# Patient Record
Sex: FEMALE | Race: BLACK OR AFRICAN AMERICAN | Employment: OTHER | URBAN - METROPOLITAN AREA
[De-identification: names, ages, dates, MRNs, and addresses within clinical notes are randomized per-mention and may not be internally consistent; named-entity substitution may affect disease eponyms.]

---

## 2023-09-18 ENCOUNTER — HOSPITAL ENCOUNTER (OUTPATIENT)
Facility: HOSPITAL | Age: 68
Discharge: HOME | End: 2023-09-21
Payer: MEDICARE

## 2023-09-18 DIAGNOSIS — M17.12 OSTEOARTHRITIS OF LEFT KNEE, UNSPECIFIED OSTEOARTHRITIS TYPE: ICD-10-CM

## 2023-09-18 LAB
ALBUMIN SERPL-MCNC: 3.5 G/DL (ref 3.4–5)
ALBUMIN/GLOB SERPL: 1.1 (ref 0.8–1.7)
ALP SERPL-CCNC: 66 U/L (ref 45–117)
ALT SERPL-CCNC: 18 U/L (ref 13–56)
ANION GAP SERPL CALC-SCNC: 4 MMOL/L (ref 3–18)
APPEARANCE UR: CLEAR
APTT PPP: 28.5 SEC (ref 23–36.4)
AST SERPL-CCNC: 11 U/L (ref 10–38)
BACTERIA URNS QL MICRO: ABNORMAL /HPF
BASOPHILS # BLD: 0 K/UL (ref 0–0.1)
BASOPHILS NFR BLD: 1 % (ref 0–2)
BILIRUB SERPL-MCNC: 0.4 MG/DL (ref 0.2–1)
BILIRUB UR QL: NEGATIVE
BUN SERPL-MCNC: 21 MG/DL (ref 7–18)
BUN/CREAT SERPL: 21 (ref 12–20)
CALCIUM SERPL-MCNC: 9 MG/DL (ref 8.5–10.1)
CHLORIDE SERPL-SCNC: 110 MMOL/L (ref 100–111)
CO2 SERPL-SCNC: 28 MMOL/L (ref 21–32)
COLOR UR: YELLOW
CREAT SERPL-MCNC: 0.99 MG/DL (ref 0.6–1.3)
DIFFERENTIAL METHOD BLD: ABNORMAL
EKG ATRIAL RATE: 56 BPM
EKG DIAGNOSIS: NORMAL
EKG P AXIS: 65 DEGREES
EKG P-R INTERVAL: 154 MS
EKG Q-T INTERVAL: 414 MS
EKG QRS DURATION: 84 MS
EKG QTC CALCULATION (BAZETT): 399 MS
EKG R AXIS: 74 DEGREES
EKG T AXIS: 63 DEGREES
EKG VENTRICULAR RATE: 56 BPM
EOSINOPHIL # BLD: 0.2 K/UL (ref 0–0.4)
EOSINOPHIL NFR BLD: 3 % (ref 0–5)
EPITH CASTS URNS QL MICRO: ABNORMAL /LPF (ref 0–5)
ERYTHROCYTE [DISTWIDTH] IN BLOOD BY AUTOMATED COUNT: 13.3 % (ref 11.6–14.5)
GLOBULIN SER CALC-MCNC: 3.2 G/DL (ref 2–4)
GLUCOSE SERPL-MCNC: 86 MG/DL (ref 74–99)
GLUCOSE UR STRIP.AUTO-MCNC: NEGATIVE MG/DL
HCT VFR BLD AUTO: 42.6 % (ref 35–45)
HGB BLD-MCNC: 13.4 G/DL (ref 12–16)
HGB UR QL STRIP: NEGATIVE
IMM GRANULOCYTES # BLD AUTO: 0 K/UL (ref 0–0.04)
IMM GRANULOCYTES NFR BLD AUTO: 1 % (ref 0–0.5)
INR PPP: 1 (ref 0.9–1.1)
KETONES UR QL STRIP.AUTO: NEGATIVE MG/DL
LEUKOCYTE ESTERASE UR QL STRIP.AUTO: ABNORMAL
LYMPHOCYTES # BLD: 2.7 K/UL (ref 0.9–3.6)
LYMPHOCYTES NFR BLD: 31 % (ref 21–52)
MCH RBC QN AUTO: 26.4 PG (ref 24–34)
MCHC RBC AUTO-ENTMCNC: 31.5 G/DL (ref 31–37)
MCV RBC AUTO: 84 FL (ref 78–100)
MONOCYTES # BLD: 0.7 K/UL (ref 0.05–1.2)
MONOCYTES NFR BLD: 8 % (ref 3–10)
NEUTS SEG # BLD: 4.8 K/UL (ref 1.8–8)
NEUTS SEG NFR BLD: 57 % (ref 40–73)
NITRITE UR QL STRIP.AUTO: NEGATIVE
NRBC # BLD: 0 K/UL (ref 0–0.01)
NRBC BLD-RTO: 0 PER 100 WBC
PH UR STRIP: 6 (ref 5–8)
PLATELET # BLD AUTO: 232 K/UL (ref 135–420)
PMV BLD AUTO: 9.6 FL (ref 9.2–11.8)
POTASSIUM SERPL-SCNC: 4.5 MMOL/L (ref 3.5–5.5)
PROT SERPL-MCNC: 6.7 G/DL (ref 6.4–8.2)
PROT UR STRIP-MCNC: NEGATIVE MG/DL
PROTHROMBIN TIME: 13.1 SEC (ref 11.9–14.7)
RBC # BLD AUTO: 5.07 M/UL (ref 4.2–5.3)
RBC #/AREA URNS HPF: ABNORMAL /HPF (ref 0–5)
SODIUM SERPL-SCNC: 142 MMOL/L (ref 136–145)
SP GR UR REFRACTOMETRY: 1.02 (ref 1–1.03)
UROBILINOGEN UR QL STRIP.AUTO: 1 EU/DL (ref 0.2–1)
WBC # BLD AUTO: 8.5 K/UL (ref 4.6–13.2)
WBC URNS QL MICRO: ABNORMAL /HPF (ref 0–5)

## 2023-09-18 PROCEDURE — 85730 THROMBOPLASTIN TIME PARTIAL: CPT

## 2023-09-18 PROCEDURE — 87186 SC STD MICRODIL/AGAR DIL: CPT

## 2023-09-18 PROCEDURE — 87077 CULTURE AEROBIC IDENTIFY: CPT

## 2023-09-18 PROCEDURE — 93005 ELECTROCARDIOGRAM TRACING: CPT

## 2023-09-18 PROCEDURE — 87086 URINE CULTURE/COLONY COUNT: CPT

## 2023-09-18 PROCEDURE — 87147 CULTURE TYPE IMMUNOLOGIC: CPT

## 2023-09-18 PROCEDURE — 80053 COMPREHEN METABOLIC PANEL: CPT

## 2023-09-18 PROCEDURE — 85610 PROTHROMBIN TIME: CPT

## 2023-09-18 PROCEDURE — 36415 COLL VENOUS BLD VENIPUNCTURE: CPT

## 2023-09-18 PROCEDURE — 85025 COMPLETE CBC W/AUTO DIFF WBC: CPT

## 2023-09-18 PROCEDURE — 81001 URINALYSIS AUTO W/SCOPE: CPT

## 2023-09-18 RX ORDER — LOSARTAN POTASSIUM 25 MG/1
25 TABLET ORAL DAILY
COMMUNITY
Start: 2023-06-20

## 2023-09-19 LAB
BACTERIA SPEC CULT: NORMAL
BACTERIA SPEC CULT: NORMAL
SERVICE CMNT-IMP: NORMAL

## 2023-09-20 LAB
BACTERIA SPEC CULT: ABNORMAL
BACTERIA SPEC CULT: ABNORMAL
CC UR VC: ABNORMAL
FAX TO NUMBER: NORMAL
SERVICE CMNT-IMP: ABNORMAL
TEST RESULTS FAXED TO: NORMAL

## 2023-09-26 LAB
FAX TO NUMBER: NORMAL
TEST RESULTS FAXED TO: NORMAL

## 2023-10-16 ENCOUNTER — ANESTHESIA EVENT (OUTPATIENT)
Facility: HOSPITAL | Age: 68
End: 2023-10-16
Payer: MEDICARE

## 2023-10-16 ENCOUNTER — HOSPITAL ENCOUNTER (OUTPATIENT)
Facility: HOSPITAL | Age: 68
Setting detail: OBSERVATION
Discharge: HOME HEALTH | End: 2023-10-17
Attending: ORTHOPAEDIC SURGERY | Admitting: ORTHOPAEDIC SURGERY
Payer: MEDICARE

## 2023-10-16 ENCOUNTER — APPOINTMENT (OUTPATIENT)
Facility: HOSPITAL | Age: 68
End: 2023-10-16
Attending: ORTHOPAEDIC SURGERY
Payer: MEDICARE

## 2023-10-16 ENCOUNTER — ANESTHESIA (OUTPATIENT)
Facility: HOSPITAL | Age: 68
End: 2023-10-16
Payer: MEDICARE

## 2023-10-16 DIAGNOSIS — Z96.652 S/P TOTAL KNEE ARTHROPLASTY, LEFT: Primary | ICD-10-CM

## 2023-10-16 PROBLEM — M17.12 OSTEOARTHRITIS OF LEFT KNEE, UNSPECIFIED OSTEOARTHRITIS TYPE: Status: ACTIVE | Noted: 2023-10-16

## 2023-10-16 PROCEDURE — G0378 HOSPITAL OBSERVATION PER HR: HCPCS

## 2023-10-16 PROCEDURE — 73560 X-RAY EXAM OF KNEE 1 OR 2: CPT

## 2023-10-16 PROCEDURE — 3700000000 HC ANESTHESIA ATTENDED CARE: Performed by: ORTHOPAEDIC SURGERY

## 2023-10-16 PROCEDURE — 6360000002 HC RX W HCPCS: Performed by: ORTHOPAEDIC SURGERY

## 2023-10-16 PROCEDURE — 36415 COLL VENOUS BLD VENIPUNCTURE: CPT

## 2023-10-16 PROCEDURE — 6360000002 HC RX W HCPCS

## 2023-10-16 PROCEDURE — C1713 ANCHOR/SCREW BN/BN,TIS/BN: HCPCS | Performed by: ORTHOPAEDIC SURGERY

## 2023-10-16 PROCEDURE — 86901 BLOOD TYPING SEROLOGIC RH(D): CPT

## 2023-10-16 PROCEDURE — 6370000000 HC RX 637 (ALT 250 FOR IP): Performed by: ORTHOPAEDIC SURGERY

## 2023-10-16 PROCEDURE — 86850 RBC ANTIBODY SCREEN: CPT

## 2023-10-16 PROCEDURE — 64447 NJX AA&/STRD FEMORAL NRV IMG: CPT | Performed by: STUDENT IN AN ORGANIZED HEALTH CARE EDUCATION/TRAINING PROGRAM

## 2023-10-16 PROCEDURE — 97161 PT EVAL LOW COMPLEX 20 MIN: CPT

## 2023-10-16 PROCEDURE — 2580000003 HC RX 258: Performed by: ORTHOPAEDIC SURGERY

## 2023-10-16 PROCEDURE — 2500000003 HC RX 250 WO HCPCS: Performed by: STUDENT IN AN ORGANIZED HEALTH CARE EDUCATION/TRAINING PROGRAM

## 2023-10-16 PROCEDURE — 97116 GAIT TRAINING THERAPY: CPT

## 2023-10-16 PROCEDURE — 7100000001 HC PACU RECOVERY - ADDTL 15 MIN: Performed by: ORTHOPAEDIC SURGERY

## 2023-10-16 PROCEDURE — 3700000001 HC ADD 15 MINUTES (ANESTHESIA): Performed by: ORTHOPAEDIC SURGERY

## 2023-10-16 PROCEDURE — C1776 JOINT DEVICE (IMPLANTABLE): HCPCS | Performed by: ORTHOPAEDIC SURGERY

## 2023-10-16 PROCEDURE — 3600000005 HC SURGERY LEVEL 5 BASE: Performed by: ORTHOPAEDIC SURGERY

## 2023-10-16 PROCEDURE — 6360000002 HC RX W HCPCS: Performed by: STUDENT IN AN ORGANIZED HEALTH CARE EDUCATION/TRAINING PROGRAM

## 2023-10-16 PROCEDURE — 2500000003 HC RX 250 WO HCPCS

## 2023-10-16 PROCEDURE — 86900 BLOOD TYPING SEROLOGIC ABO: CPT

## 2023-10-16 PROCEDURE — 7100000000 HC PACU RECOVERY - FIRST 15 MIN: Performed by: ORTHOPAEDIC SURGERY

## 2023-10-16 PROCEDURE — C9290 INJ, BUPIVACAINE LIPOSOME: HCPCS | Performed by: ORTHOPAEDIC SURGERY

## 2023-10-16 PROCEDURE — 2720000010 HC SURG SUPPLY STERILE: Performed by: ORTHOPAEDIC SURGERY

## 2023-10-16 PROCEDURE — 97530 THERAPEUTIC ACTIVITIES: CPT

## 2023-10-16 PROCEDURE — 2709999900 HC NON-CHARGEABLE SUPPLY: Performed by: ORTHOPAEDIC SURGERY

## 2023-10-16 PROCEDURE — 3600000015 HC SURGERY LEVEL 5 ADDTL 15MIN: Performed by: ORTHOPAEDIC SURGERY

## 2023-10-16 DEVICE — ATTUNE PATELLA MEDIALIZED DOME 35MM CEMENTED AOX
Type: IMPLANTABLE DEVICE | Site: KNEE | Status: FUNCTIONAL
Brand: ATTUNE

## 2023-10-16 DEVICE — ATTUNE KNEE SYSTEM TIBIAL BASE AFFIXIUM FIXED BEARING SIZE 6
Type: IMPLANTABLE DEVICE | Site: KNEE | Status: FUNCTIONAL
Brand: ATTUNE AFFIXIUM

## 2023-10-16 DEVICE — ATTUNE KNEE SYSTEM FEMORAL POROCOAT CRUCIATE RETAINING SIZE 6 LEFT CEMENTLESS
Type: IMPLANTABLE DEVICE | Site: KNEE | Status: FUNCTIONAL
Brand: ATTUNE

## 2023-10-16 DEVICE — ATTUNE KNEE SYSTEM TIBIAL INSERT FIXED BEARING MEDIAL STABILIZED LEFT AOX 6, 6MM
Type: IMPLANTABLE DEVICE | Site: KNEE | Status: FUNCTIONAL
Brand: ATTUNE

## 2023-10-16 DEVICE — CEMENT BNE 20GM HALF DOSE PMMA VISC RADPQ FAST: Type: IMPLANTABLE DEVICE | Site: KNEE | Status: FUNCTIONAL

## 2023-10-16 RX ORDER — OXYCODONE HYDROCHLORIDE AND ACETAMINOPHEN 5; 325 MG/1; MG/1
1 TABLET ORAL EVERY 4 HOURS PRN
Qty: 42 TABLET | Refills: 0 | Status: SHIPPED | OUTPATIENT
Start: 2023-10-16 | End: 2023-10-23

## 2023-10-16 RX ORDER — CEFADROXIL 500 MG/1
500 CAPSULE ORAL 2 TIMES DAILY
Qty: 14 CAPSULE | Refills: 0 | Status: SHIPPED | OUTPATIENT
Start: 2023-10-16 | End: 2023-10-23

## 2023-10-16 RX ORDER — OXYCODONE HYDROCHLORIDE 5 MG/1
10 TABLET ORAL EVERY 4 HOURS PRN
Status: DISCONTINUED | OUTPATIENT
Start: 2023-10-16 | End: 2023-10-17 | Stop reason: HOSPADM

## 2023-10-16 RX ORDER — ROPIVACAINE HYDROCHLORIDE 5 MG/ML
INJECTION, SOLUTION EPIDURAL; INFILTRATION; PERINEURAL
Status: COMPLETED | OUTPATIENT
Start: 2023-10-16 | End: 2023-10-16

## 2023-10-16 RX ORDER — DEXAMETHASONE SODIUM PHOSPHATE 10 MG/ML
INJECTION, SOLUTION INTRAMUSCULAR; INTRAVENOUS
Status: COMPLETED | OUTPATIENT
Start: 2023-10-16 | End: 2023-10-16

## 2023-10-16 RX ORDER — CEPHALEXIN 500 MG/1
CAPSULE ORAL
COMMUNITY
Start: 2023-09-29

## 2023-10-16 RX ORDER — SODIUM CHLORIDE 0.9 % (FLUSH) 0.9 %
5-40 SYRINGE (ML) INJECTION EVERY 12 HOURS SCHEDULED
Status: DISCONTINUED | OUTPATIENT
Start: 2023-10-16 | End: 2023-10-17 | Stop reason: HOSPADM

## 2023-10-16 RX ORDER — DROPERIDOL 2.5 MG/ML
0.62 INJECTION, SOLUTION INTRAMUSCULAR; INTRAVENOUS
Status: DISCONTINUED | OUTPATIENT
Start: 2023-10-16 | End: 2023-10-16 | Stop reason: HOSPADM

## 2023-10-16 RX ORDER — ACETAMINOPHEN 325 MG/1
650 TABLET ORAL EVERY 6 HOURS
Status: DISCONTINUED | OUTPATIENT
Start: 2023-10-16 | End: 2023-10-17 | Stop reason: HOSPADM

## 2023-10-16 RX ORDER — TRANEXAMIC ACID 10 MG/ML
INJECTION, SOLUTION INTRAVENOUS PRN
Status: DISCONTINUED | OUTPATIENT
Start: 2023-10-16 | End: 2023-10-16 | Stop reason: SDUPTHER

## 2023-10-16 RX ORDER — DIPHENHYDRAMINE HYDROCHLORIDE 50 MG/ML
12.5 INJECTION INTRAMUSCULAR; INTRAVENOUS
Status: DISCONTINUED | OUTPATIENT
Start: 2023-10-16 | End: 2023-10-16 | Stop reason: HOSPADM

## 2023-10-16 RX ORDER — SODIUM CHLORIDE 0.9 % (FLUSH) 0.9 %
5-40 SYRINGE (ML) INJECTION EVERY 12 HOURS SCHEDULED
Status: DISCONTINUED | OUTPATIENT
Start: 2023-10-16 | End: 2023-10-16 | Stop reason: HOSPADM

## 2023-10-16 RX ORDER — DEXMEDETOMIDINE HYDROCHLORIDE 100 UG/ML
INJECTION, SOLUTION INTRAVENOUS
Status: COMPLETED | OUTPATIENT
Start: 2023-10-16 | End: 2023-10-16

## 2023-10-16 RX ORDER — HYDROMORPHONE HYDROCHLORIDE 1 MG/ML
0.25 INJECTION, SOLUTION INTRAMUSCULAR; INTRAVENOUS; SUBCUTANEOUS
Status: DISCONTINUED | OUTPATIENT
Start: 2023-10-16 | End: 2023-10-17 | Stop reason: HOSPADM

## 2023-10-16 RX ORDER — OXYCODONE HYDROCHLORIDE 5 MG/1
5 TABLET ORAL EVERY 4 HOURS PRN
Status: DISCONTINUED | OUTPATIENT
Start: 2023-10-16 | End: 2023-10-17 | Stop reason: HOSPADM

## 2023-10-16 RX ORDER — ONDANSETRON 2 MG/ML
4 INJECTION INTRAMUSCULAR; INTRAVENOUS
Status: DISCONTINUED | OUTPATIENT
Start: 2023-10-16 | End: 2023-10-16 | Stop reason: HOSPADM

## 2023-10-16 RX ORDER — PROPOFOL 10 MG/ML
INJECTION, EMULSION INTRAVENOUS CONTINUOUS PRN
Status: DISCONTINUED | OUTPATIENT
Start: 2023-10-16 | End: 2023-10-16 | Stop reason: SDUPTHER

## 2023-10-16 RX ORDER — LIDOCAINE HYDROCHLORIDE 20 MG/ML
INJECTION, SOLUTION INTRAVENOUS PRN
Status: DISCONTINUED | OUTPATIENT
Start: 2023-10-16 | End: 2023-10-16 | Stop reason: SDUPTHER

## 2023-10-16 RX ORDER — SENNOSIDES 8.6 MG
650 CAPSULE ORAL EVERY 8 HOURS PRN
Qty: 60 TABLET | Refills: 3 | Status: SHIPPED | OUTPATIENT
Start: 2023-10-16

## 2023-10-16 RX ORDER — SODIUM CHLORIDE, SODIUM LACTATE, POTASSIUM CHLORIDE, CALCIUM CHLORIDE 600; 310; 30; 20 MG/100ML; MG/100ML; MG/100ML; MG/100ML
INJECTION, SOLUTION INTRAVENOUS CONTINUOUS
Status: DISCONTINUED | OUTPATIENT
Start: 2023-10-16 | End: 2023-10-16

## 2023-10-16 RX ORDER — ATORVASTATIN CALCIUM 10 MG/1
10 TABLET, FILM COATED ORAL DAILY
Status: DISCONTINUED | OUTPATIENT
Start: 2023-10-16 | End: 2023-10-17 | Stop reason: HOSPADM

## 2023-10-16 RX ORDER — BUPIVACAINE HYDROCHLORIDE 7.5 MG/ML
INJECTION, SOLUTION INTRASPINAL
Status: COMPLETED | OUTPATIENT
Start: 2023-10-16 | End: 2023-10-16

## 2023-10-16 RX ORDER — MIDAZOLAM HYDROCHLORIDE 2 MG/2ML
2 INJECTION, SOLUTION INTRAMUSCULAR; INTRAVENOUS
Status: DISCONTINUED | OUTPATIENT
Start: 2023-10-16 | End: 2023-10-16 | Stop reason: HOSPADM

## 2023-10-16 RX ORDER — SODIUM CHLORIDE 9 MG/ML
INJECTION, SOLUTION INTRAVENOUS PRN
Status: DISCONTINUED | OUTPATIENT
Start: 2023-10-16 | End: 2023-10-16 | Stop reason: HOSPADM

## 2023-10-16 RX ORDER — MIDAZOLAM HYDROCHLORIDE 1 MG/ML
INJECTION INTRAMUSCULAR; INTRAVENOUS
Status: COMPLETED
Start: 2023-10-16 | End: 2023-10-16

## 2023-10-16 RX ORDER — HYDROMORPHONE HYDROCHLORIDE 1 MG/ML
0.5 INJECTION, SOLUTION INTRAMUSCULAR; INTRAVENOUS; SUBCUTANEOUS EVERY 5 MIN PRN
Status: DISCONTINUED | OUTPATIENT
Start: 2023-10-16 | End: 2023-10-16 | Stop reason: HOSPADM

## 2023-10-16 RX ORDER — ONDANSETRON 4 MG/1
4 TABLET, ORALLY DISINTEGRATING ORAL EVERY 8 HOURS PRN
Status: DISCONTINUED | OUTPATIENT
Start: 2023-10-16 | End: 2023-10-17 | Stop reason: HOSPADM

## 2023-10-16 RX ORDER — FENTANYL CITRATE 50 UG/ML
INJECTION, SOLUTION INTRAMUSCULAR; INTRAVENOUS
Status: COMPLETED | OUTPATIENT
Start: 2023-10-16 | End: 2023-10-16

## 2023-10-16 RX ORDER — SODIUM CHLORIDE 9 MG/ML
INJECTION, SOLUTION INTRAVENOUS PRN
Status: DISCONTINUED | OUTPATIENT
Start: 2023-10-16 | End: 2023-10-17 | Stop reason: HOSPADM

## 2023-10-16 RX ORDER — FAMOTIDINE 20 MG/1
20 TABLET, FILM COATED ORAL EVERY 12 HOURS
Status: DISCONTINUED | OUTPATIENT
Start: 2023-10-16 | End: 2023-10-17 | Stop reason: HOSPADM

## 2023-10-16 RX ORDER — FENTANYL CITRATE 50 UG/ML
INJECTION, SOLUTION INTRAMUSCULAR; INTRAVENOUS
Status: COMPLETED
Start: 2023-10-16 | End: 2023-10-16

## 2023-10-16 RX ORDER — EPHEDRINE SULFATE/0.9% NACL/PF 50 MG/5 ML
SYRINGE (ML) INTRAVENOUS PRN
Status: DISCONTINUED | OUTPATIENT
Start: 2023-10-16 | End: 2023-10-16 | Stop reason: SDUPTHER

## 2023-10-16 RX ORDER — ASPIRIN 81 MG/1
81 TABLET ORAL 2 TIMES DAILY
Qty: 50 TABLET | Refills: 0 | Status: SHIPPED | OUTPATIENT
Start: 2023-10-16 | End: 2024-10-15

## 2023-10-16 RX ORDER — TRANEXAMIC ACID 10 MG/ML
1000 INJECTION, SOLUTION INTRAVENOUS
Status: DISCONTINUED | OUTPATIENT
Start: 2023-10-16 | End: 2023-10-17 | Stop reason: HOSPADM

## 2023-10-16 RX ORDER — VANCOMYCIN HYDROCHLORIDE 1 G/20ML
INJECTION, POWDER, LYOPHILIZED, FOR SOLUTION INTRAVENOUS PRN
Status: DISCONTINUED | OUTPATIENT
Start: 2023-10-16 | End: 2023-10-16 | Stop reason: ALTCHOICE

## 2023-10-16 RX ORDER — SODIUM CHLORIDE, SODIUM LACTATE, POTASSIUM CHLORIDE, CALCIUM CHLORIDE 600; 310; 30; 20 MG/100ML; MG/100ML; MG/100ML; MG/100ML
INJECTION, SOLUTION INTRAVENOUS CONTINUOUS
Status: DISCONTINUED | OUTPATIENT
Start: 2023-10-16 | End: 2023-10-17 | Stop reason: HOSPADM

## 2023-10-16 RX ORDER — FENTANYL CITRATE 50 UG/ML
50 INJECTION, SOLUTION INTRAMUSCULAR; INTRAVENOUS EVERY 5 MIN PRN
Status: DISCONTINUED | OUTPATIENT
Start: 2023-10-16 | End: 2023-10-16 | Stop reason: HOSPADM

## 2023-10-16 RX ORDER — KETOROLAC TROMETHAMINE 15 MG/ML
15 INJECTION, SOLUTION INTRAMUSCULAR; INTRAVENOUS EVERY 6 HOURS
Status: DISCONTINUED | OUTPATIENT
Start: 2023-10-16 | End: 2023-10-17 | Stop reason: HOSPADM

## 2023-10-16 RX ORDER — LOSARTAN POTASSIUM 25 MG/1
25 TABLET ORAL DAILY
Status: DISCONTINUED | OUTPATIENT
Start: 2023-10-16 | End: 2023-10-17 | Stop reason: HOSPADM

## 2023-10-16 RX ORDER — SODIUM CHLORIDE 0.9 % (FLUSH) 0.9 %
5-40 SYRINGE (ML) INJECTION PRN
Status: DISCONTINUED | OUTPATIENT
Start: 2023-10-16 | End: 2023-10-17 | Stop reason: HOSPADM

## 2023-10-16 RX ORDER — BUPIVACAINE HYDROCHLORIDE 2.5 MG/ML
INJECTION, SOLUTION EPIDURAL; INFILTRATION; INTRACAUDAL PRN
Status: DISCONTINUED | OUTPATIENT
Start: 2023-10-16 | End: 2023-10-16 | Stop reason: ALTCHOICE

## 2023-10-16 RX ORDER — TRAMADOL HYDROCHLORIDE 50 MG/1
50 TABLET ORAL EVERY 4 HOURS PRN
Qty: 42 TABLET | Refills: 0 | Status: SHIPPED | OUTPATIENT
Start: 2023-10-16 | End: 2023-10-23

## 2023-10-16 RX ORDER — MELOXICAM 7.5 MG/1
7.5 TABLET ORAL DAILY
Qty: 30 TABLET | Refills: 3 | Status: SHIPPED | OUTPATIENT
Start: 2023-10-16

## 2023-10-16 RX ORDER — PANTOPRAZOLE SODIUM 40 MG/1
40 TABLET, DELAYED RELEASE ORAL
Qty: 30 TABLET | Refills: 0 | Status: SHIPPED | OUTPATIENT
Start: 2023-10-16

## 2023-10-16 RX ORDER — ONDANSETRON 2 MG/ML
4 INJECTION INTRAMUSCULAR; INTRAVENOUS EVERY 6 HOURS PRN
Status: DISCONTINUED | OUTPATIENT
Start: 2023-10-16 | End: 2023-10-17 | Stop reason: HOSPADM

## 2023-10-16 RX ORDER — LABETALOL HYDROCHLORIDE 5 MG/ML
10 INJECTION, SOLUTION INTRAVENOUS
Status: DISCONTINUED | OUTPATIENT
Start: 2023-10-16 | End: 2023-10-16 | Stop reason: HOSPADM

## 2023-10-16 RX ORDER — HYDROMORPHONE HYDROCHLORIDE 1 MG/ML
0.5 INJECTION, SOLUTION INTRAMUSCULAR; INTRAVENOUS; SUBCUTANEOUS
Status: DISCONTINUED | OUTPATIENT
Start: 2023-10-16 | End: 2023-10-17 | Stop reason: HOSPADM

## 2023-10-16 RX ORDER — ASPIRIN 81 MG/1
81 TABLET ORAL 2 TIMES DAILY
Status: DISCONTINUED | OUTPATIENT
Start: 2023-10-16 | End: 2023-10-17 | Stop reason: HOSPADM

## 2023-10-16 RX ORDER — ONDANSETRON 4 MG/1
4 TABLET, FILM COATED ORAL 3 TIMES DAILY PRN
Qty: 30 TABLET | Refills: 0 | Status: SHIPPED | OUTPATIENT
Start: 2023-10-16

## 2023-10-16 RX ORDER — DEXAMETHASONE SODIUM PHOSPHATE 4 MG/ML
INJECTION, SOLUTION INTRA-ARTICULAR; INTRALESIONAL; INTRAMUSCULAR; INTRAVENOUS; SOFT TISSUE PRN
Status: DISCONTINUED | OUTPATIENT
Start: 2023-10-16 | End: 2023-10-16 | Stop reason: SDUPTHER

## 2023-10-16 RX ORDER — SODIUM CHLORIDE 0.9 % (FLUSH) 0.9 %
5-40 SYRINGE (ML) INJECTION PRN
Status: DISCONTINUED | OUTPATIENT
Start: 2023-10-16 | End: 2023-10-16 | Stop reason: HOSPADM

## 2023-10-16 RX ORDER — MIDAZOLAM HYDROCHLORIDE 1 MG/ML
INJECTION INTRAMUSCULAR; INTRAVENOUS
Status: COMPLETED | OUTPATIENT
Start: 2023-10-16 | End: 2023-10-16

## 2023-10-16 RX ADMIN — ONDANSETRON 4 MG: 2 INJECTION INTRAMUSCULAR; INTRAVENOUS at 17:08

## 2023-10-16 RX ADMIN — OXYCODONE HYDROCHLORIDE 10 MG: 5 TABLET ORAL at 14:36

## 2023-10-16 RX ADMIN — LIDOCAINE HYDROCHLORIDE 100 MG: 20 INJECTION, SOLUTION INTRAVENOUS at 10:52

## 2023-10-16 RX ADMIN — DEXAMETHASONE SODIUM PHOSPHATE 4 MG: 10 INJECTION, SOLUTION INTRAMUSCULAR; INTRAVENOUS at 09:23

## 2023-10-16 RX ADMIN — KETOROLAC TROMETHAMINE 15 MG: 15 INJECTION, SOLUTION INTRAMUSCULAR; INTRAVENOUS at 15:15

## 2023-10-16 RX ADMIN — SODIUM CHLORIDE, POTASSIUM CHLORIDE, SODIUM LACTATE AND CALCIUM CHLORIDE: 600; 310; 30; 20 INJECTION, SOLUTION INTRAVENOUS at 14:24

## 2023-10-16 RX ADMIN — OXYCODONE HYDROCHLORIDE 10 MG: 5 TABLET ORAL at 19:23

## 2023-10-16 RX ADMIN — HYDROMORPHONE HYDROCHLORIDE 0.25 MG: 1 INJECTION, SOLUTION INTRAMUSCULAR; INTRAVENOUS; SUBCUTANEOUS at 17:21

## 2023-10-16 RX ADMIN — FAMOTIDINE 20 MG: 20 TABLET ORAL at 15:18

## 2023-10-16 RX ADMIN — WATER 2000 MG: 1 INJECTION INTRAMUSCULAR; INTRAVENOUS; SUBCUTANEOUS at 10:52

## 2023-10-16 RX ADMIN — KETOROLAC TROMETHAMINE 15 MG: 15 INJECTION, SOLUTION INTRAMUSCULAR; INTRAVENOUS at 20:37

## 2023-10-16 RX ADMIN — PROPOFOL 50 MG: 10 INJECTION, EMULSION INTRAVENOUS at 10:52

## 2023-10-16 RX ADMIN — FENTANYL CITRATE 100 MCG: 50 INJECTION, SOLUTION INTRAMUSCULAR; INTRAVENOUS at 09:20

## 2023-10-16 RX ADMIN — ASPIRIN 81 MG: 81 TABLET, COATED ORAL at 20:37

## 2023-10-16 RX ADMIN — ACETAMINOPHEN 650 MG: 325 TABLET ORAL at 20:37

## 2023-10-16 RX ADMIN — DEXMEDETOMIDINE HYDROCHLORIDE 0.2 ML: 100 INJECTION, SOLUTION INTRAVENOUS at 09:23

## 2023-10-16 RX ADMIN — Medication 10 MG: at 11:19

## 2023-10-16 RX ADMIN — SODIUM CHLORIDE, SODIUM LACTATE, POTASSIUM CHLORIDE, AND CALCIUM CHLORIDE: 600; 310; 30; 20 INJECTION, SOLUTION INTRAVENOUS at 12:10

## 2023-10-16 RX ADMIN — WATER 2000 MG: 1 INJECTION INTRAMUSCULAR; INTRAVENOUS; SUBCUTANEOUS at 20:02

## 2023-10-16 RX ADMIN — Medication 10 MG: at 12:23

## 2023-10-16 RX ADMIN — MIDAZOLAM 2 MG: 1 INJECTION INTRAMUSCULAR; INTRAVENOUS at 09:20

## 2023-10-16 RX ADMIN — LOSARTAN POTASSIUM 25 MG: 25 TABLET, FILM COATED ORAL at 15:18

## 2023-10-16 RX ADMIN — TRANEXAMIC ACID 1 G: 10 INJECTION, SOLUTION INTRAVENOUS at 10:52

## 2023-10-16 RX ADMIN — PROPOFOL 75 MCG/KG/MIN: 10 INJECTION, EMULSION INTRAVENOUS at 10:55

## 2023-10-16 RX ADMIN — Medication 10 MG: at 11:57

## 2023-10-16 RX ADMIN — ROPIVACAINE HYDROCHLORIDE 20 ML: 5 INJECTION EPIDURAL; INFILTRATION; PERINEURAL at 09:23

## 2023-10-16 RX ADMIN — ASPIRIN 81 MG: 81 TABLET, COATED ORAL at 15:18

## 2023-10-16 RX ADMIN — SODIUM CHLORIDE, SODIUM LACTATE, POTASSIUM CHLORIDE, AND CALCIUM CHLORIDE: 600; 310; 30; 20 INJECTION, SOLUTION INTRAVENOUS at 08:42

## 2023-10-16 RX ADMIN — ACETAMINOPHEN 650 MG: 325 TABLET ORAL at 15:18

## 2023-10-16 RX ADMIN — ATORVASTATIN CALCIUM 10 MG: 10 TABLET, FILM COATED ORAL at 15:18

## 2023-10-16 RX ADMIN — OXYCODONE HYDROCHLORIDE 10 MG: 5 TABLET ORAL at 23:47

## 2023-10-16 RX ADMIN — TRANEXAMIC ACID 1 G: 10 INJECTION, SOLUTION INTRAVENOUS at 12:35

## 2023-10-16 RX ADMIN — BUPIVACAINE HYDROCHLORIDE IN DEXTROSE 12 MG: 7.5 INJECTION, SOLUTION SUBARACHNOID at 10:47

## 2023-10-16 RX ADMIN — DEXAMETHASONE SODIUM PHOSPHATE 6 MG: 4 INJECTION, SOLUTION INTRAMUSCULAR; INTRAVENOUS at 10:52

## 2023-10-16 ASSESSMENT — PAIN DESCRIPTION - LOCATION
LOCATION: KNEE
LOCATION: ABDOMEN

## 2023-10-16 ASSESSMENT — PAIN DESCRIPTION - ORIENTATION
ORIENTATION: LEFT

## 2023-10-16 ASSESSMENT — PAIN SCALES - GENERAL
PAINLEVEL_OUTOF10: 10
PAINLEVEL_OUTOF10: 5
PAINLEVEL_OUTOF10: 5
PAINLEVEL_OUTOF10: 3
PAINLEVEL_OUTOF10: 6
PAINLEVEL_OUTOF10: 0
PAINLEVEL_OUTOF10: 8
PAINLEVEL_OUTOF10: 0
PAINLEVEL_OUTOF10: 0
PAINLEVEL_OUTOF10: 7
PAINLEVEL_OUTOF10: 6

## 2023-10-16 ASSESSMENT — PAIN DESCRIPTION - ONSET: ONSET: ON-GOING

## 2023-10-16 ASSESSMENT — PAIN DESCRIPTION - DESCRIPTORS
DESCRIPTORS: ACHING
DESCRIPTORS: ACHING;SHARP
DESCRIPTORS: DULL;ACHING;STABBING;SHARP
DESCRIPTORS: ACHING
DESCRIPTORS: ACHING

## 2023-10-16 ASSESSMENT — PAIN - FUNCTIONAL ASSESSMENT
PAIN_FUNCTIONAL_ASSESSMENT: PREVENTS OR INTERFERES WITH MANY ACTIVE NOT PASSIVE ACTIVITIES
PAIN_FUNCTIONAL_ASSESSMENT: 0-10

## 2023-10-16 ASSESSMENT — PAIN DESCRIPTION - FREQUENCY: FREQUENCY: CONTINUOUS

## 2023-10-16 ASSESSMENT — PAIN DESCRIPTION - PAIN TYPE: TYPE: SURGICAL PAIN

## 2023-10-16 NOTE — PERIOP NOTE
TRANSFER - OUT REPORT:    Verbal report given to [facility] RN  on Christoph Seeds  being transferred to [facility]  for routine progression of patient care       Report consisted of patient's Situation, Background, Assessment and   Recommendations(SBAR). Information from the following report(s) Adult Overview, Surgery Report, Intake/Output, and MAR was reviewed with the receiving nurse. Lines:   Peripheral IV 10/16/23 Left;Posterior Hand (Active)   Site Assessment Clean, dry & intact 10/16/23 1323   Line Status Infusing 10/16/23 [address] [facility] Connections checked and tightened 10/16/23 1036   Phlebitis Assessment No symptoms 10/16/23 1111   Infiltration Assessment 0 10/16/23 1111   Dressing Status Clean, dry & intact 10/16/23 1111   Dressing Type Transparent 10/16/23 1111        Opportunity for questions and clarification was provided.       Patient transported with:  Registered Nurse and [facility]

## 2023-10-16 NOTE — INTERVAL H&P NOTE
Update History & Physical    Patient seen and examined. History and Physical Exam was reviewed.  No changes to History and Physical Exam.    Ellen Vaz, DO

## 2023-10-16 NOTE — PERIOP NOTE
TRANSFER - IN REPORT:    Verbal report received from Dr. Louis Hagen RN  on Marcy Mayen  being received from OR  for routine progression of patient care      Report consisted of patient's Situation, Background, Assessment and   Recommendations(SBAR). Information from the following report(s) Surgery Report, Intake/Output, MAR, and Recent Results was reviewed with the receiving nurse. Opportunity for questions and clarification was provided. Assessment completed upon patient's arrival to unit and care assumed.

## 2023-10-16 NOTE — PERIOP NOTE
Reviewed PTA medication list with patient/caregiver and patient/caregiver denies any additional medications. Patient admits to having a responsible adult care for them at home for at least 24 hours after surgery. Patient encouraged to use gown warming system and informed that using said warming gown to regulate body temperature prior to a procedure has been shown to help reduce the risks of blood clots and infection. Patient's pharmacy of choice verified and documented in PTA medication section. Dual skin assessment & fall risk band verification completed with Svetlana CABRAL RN.

## 2023-10-16 NOTE — ANESTHESIA PRE PROCEDURE
administered. Anesthetic plan and risks discussed with patient.               Post-op pain plan if not by surgeon: single peripheral nerve block            Raghav Myers MD   10/16/2023

## 2023-10-16 NOTE — OP NOTE
COMPONENT PAT [id number] KNEE POLY DOME YULISSA MEDIALIZED ATTUNE - [id number]  COMPONENT PAT [id number] KNEE POLY DOME YULISSA MEDIALIZED ATTUNE  JN DEPUY [employer]- [id number] Left 1 Implanted   INSERT TIB FIX BEAR 6 6 MM LT MEDL KNEE STBL AOX ATTUNE - [id number]  INSERT TIB FIX BEAR 6 6 MM LT MEDL KNEE STBL AOX ATTUNE  JN DEPUY [facility] [employer]- [id number] Left 1 Implanted        Patient Condition: Stable.    ---------  INDICATIONS:   This 76y.o.-year-old female has had pain in the left knee for years and has become functionally disabled with respect to the activities of daily living. The pain is increased with weight-bearing. The pain and dysfunction were not releaved by at least three months of conservative therapy such as NSAIDS (ibuprofen, aleve), analgesics (tylenol, ), structured flexibility and muscle strengthening physical therapy exercises, activity restrictions, intra-articular cortisone and viscosupplementation injections, bracing, and use of a cane. The radiographs showed varus alignment and advanced arthritis with joint space narrowing, subchondral sclerosis, and periarticular osteophytes. A left total knee replacement has been recommended. The risks and the possible complications of this surgery and anesthesia including, but not exclusive to, bleeding, infection, dislocation, fracture, blood clots, nerve and vascular injury, possible need for other procedures, and possibly death have been explained to the patient. The patient accepts these risks for the benefits of joint replacement over the failure of non-operative care to provide adequate pain relief and improve their functional disability. The patients medical problems have been addressed by their primary care physician and are deemed stable and as well controlled as possible. Inpatient hospital care was medically necessary, reasonable, and appropriate. This is a medically necessary procedure.   As such, without use of a surgical

## 2023-10-16 NOTE — ANESTHESIA PROCEDURE NOTES
Peripheral Block    Patient location during procedure: holding area  Reason for block: post-op pain management and at surgeon's request  Start time: 10/16/2023 9:20 AM  End time: 10/16/2023 9:23 AM  Staffing  Performed: anesthesiologist   Anesthesiologist: Kiara Hardin MD  Performed by: Kiara Hardin MD  Authorized by: Kiara Hardin MD    Preanesthetic Checklist  Completed: patient identified, IV checked, site marked, risks and benefits discussed, surgical/procedural consents, equipment checked, pre-op evaluation, timeout performed, anesthesia consent given, oxygen available and monitors applied/VS acknowledged  Peripheral Block   Patient position: supine  Prep: ChloraPrep  Provider prep: mask and sterile gloves  Patient monitoring: cardiac monitor, continuous pulse ox, frequent blood pressure checks, IV access, responsive to questions and oxygen  Block type: Saphenous (Adductor canal)  Laterality: left  Injection technique: single-shot  Guidance: ultrasound guided    Needle   Needle type: insulated echogenic nerve stimulator needle   Needle gauge: 21 G  Needle localization: ultrasound guidance  Needle length: 8 cm  Assessment   Injection assessment: negative aspiration for heme, no paresthesia on injection, local visualized surrounding nerve on ultrasound and no intravascular symptoms  Paresthesia pain: none  Slow fractionated injection: yes  Hemodynamics: stable  Real-time US image taken/store: yes  Outcomes: uncomplicated and patient tolerated procedure well    Additional Notes  Ultrasound guidance was used to view and verify medication disbursement and was also used for needle placement.   Medications Administered  ropivacaine (NAROPIN) injection 0.5% - Perineural   20 mL - 10/16/2023 9:23:00 AM  fentaNYL (SUBLIMAZE) injection - IntraVENous   100 mcg - 10/16/2023 9:20:00 AM  midazolam (VERSED) injection 2 mg/2mL - IntraVENous   2 mg - 10/16/2023 9:20:00 AM  dexamethasone (DECADRON) (PF) 10 mg/mL injection -

## 2023-10-16 NOTE — PERIOP NOTE
Patient states that she completed Antibiotic regimen for bacterial infection on 10/10/2023, currently has no symptoms of UTI. Patient denies burning, bleeding, frequency and urgency at this time.

## 2023-10-16 NOTE — ANESTHESIA POSTPROCEDURE EVALUATION
Department of Anesthesiology  Postprocedure Note    Patient: Prema Dangelo  MRN: [id number]  YOB: 1955  Date of evaluation: 10/16/2023      Procedure Summary     Date: 10/16/23 Room / Location: [facility] 07 / [facility] MAIN OR    Anesthesia Start: 5731 Anesthesia Stop: 9375    Procedure: LEFT TOTAL KNEE ARTHROPLASTY AND ALL INDICATED PROCEDURES OP23 (Left: Knee) Diagnosis:       Primary osteoarthritis of left knee      (Primary osteoarthritis of left knee [M17.12])    Surgeons:  Monte Gitelman, DO Responsible Provider: Elham Gates MD    Anesthesia Type: Regional, MAC, Spinal ASA Status: 2          Anesthesia Type: Regional, MAC, Spinal    Sabine Phase I: Sabine Score: 8    Sabine Phase II:        Anesthesia Post Evaluation    Patient location during evaluation: PACU  Patient participation: complete - patient participated  Level of consciousness: awake and alert  Airway patency: patent  Nausea & Vomiting: no nausea and no vomiting  Complications: no  Cardiovascular status: blood pressure returned to baseline  Respiratory status: acceptable  Hydration status: euvolemic  Pain management: adequate

## 2023-10-16 NOTE — ANESTHESIA PROCEDURE NOTES
Spinal Block    Patient location during procedure: OR  End time: 10/16/2023 10:47 AM  Reason for block: procedure for pain, primary anesthetic and at surgeon's request  Staffing  Performed: resident/CRNA   Resident/CRNA: LEEANN Bower CRNA  Performed by: LEEANN Bower CRNA  Authorized by: Zac Lainez MD    Spinal Block  Patient position: sitting  Prep: ChloraPrep and site prepped and draped  Patient monitoring: cardiac monitor, continuous pulse ox and frequent blood pressure checks  Approach: midline  Location: L3/L4  Guidance: landmark technique  Provider prep: mask and sterile gloves  Local infiltration: lidocaine  Needle  Needle type: Mateo   Needle gauge: 25 G  Needle length: 3.5 in  Kit: [id number]  Expiration date: 10/31/2025  Assessment  Swirl obtained: Yes  CSF: clear  Attempts: 2 (1st attempt @ L4-L5 was os.  2nd attempt @ L3-L4 successful)  Hemodynamics: stable  Preanesthetic Checklist  Completed: patient identified, IV checked, site marked, risks and benefits discussed, surgical/procedural consents, equipment checked, pre-op evaluation, timeout performed, anesthesia consent given, oxygen available, monitors applied/VS acknowledged, fire risk safety assessment completed and verbalized and blood product R/B/A discussed and consented

## 2023-10-17 VITALS
WEIGHT: 242.56 LBS | TEMPERATURE: 97.8 F | DIASTOLIC BLOOD PRESSURE: 58 MMHG | HEART RATE: 61 BPM | OXYGEN SATURATION: 100 % | HEIGHT: 66 IN | RESPIRATION RATE: 18 BRPM | SYSTOLIC BLOOD PRESSURE: 136 MMHG | BODY MASS INDEX: 38.98 KG/M2

## 2023-10-17 LAB
ABO + RH BLD: NORMAL
ANION GAP SERPL CALC-SCNC: 7 MMOL/L (ref 3–18)
BLOOD GROUP ANTIBODIES SERPL: NORMAL
BUN SERPL-MCNC: 15 MG/DL (ref 7–18)
BUN/CREAT SERPL: 18 (ref 12–20)
CALCIUM SERPL-MCNC: 8.5 MG/DL (ref 8.5–10.1)
CHLORIDE SERPL-SCNC: 105 MMOL/L (ref 100–111)
CO2 SERPL-SCNC: 26 MMOL/L (ref 21–32)
CREAT SERPL-MCNC: 0.85 MG/DL (ref 0.6–1.3)
GLUCOSE SERPL-MCNC: 114 MG/DL (ref 74–99)
POTASSIUM SERPL-SCNC: 4.3 MMOL/L (ref 3.5–5.5)
SODIUM SERPL-SCNC: 138 MMOL/L (ref 136–145)
SPECIMEN EXP DATE BLD: NORMAL

## 2023-10-17 PROCEDURE — 6370000000 HC RX 637 (ALT 250 FOR IP): Performed by: ORTHOPAEDIC SURGERY

## 2023-10-17 PROCEDURE — G0378 HOSPITAL OBSERVATION PER HR: HCPCS

## 2023-10-17 PROCEDURE — 97535 SELF CARE MNGMENT TRAINING: CPT

## 2023-10-17 PROCEDURE — 36415 COLL VENOUS BLD VENIPUNCTURE: CPT

## 2023-10-17 PROCEDURE — 96361 HYDRATE IV INFUSION ADD-ON: CPT

## 2023-10-17 PROCEDURE — 96376 TX/PRO/DX INJ SAME DRUG ADON: CPT

## 2023-10-17 PROCEDURE — 97116 GAIT TRAINING THERAPY: CPT

## 2023-10-17 PROCEDURE — 80048 BASIC METABOLIC PNL TOTAL CA: CPT

## 2023-10-17 PROCEDURE — 6360000002 HC RX W HCPCS: Performed by: ORTHOPAEDIC SURGERY

## 2023-10-17 PROCEDURE — 2580000003 HC RX 258: Performed by: ORTHOPAEDIC SURGERY

## 2023-10-17 PROCEDURE — 96374 THER/PROPH/DIAG INJ IV PUSH: CPT

## 2023-10-17 PROCEDURE — 97165 OT EVAL LOW COMPLEX 30 MIN: CPT

## 2023-10-17 RX ADMIN — OXYCODONE HYDROCHLORIDE 10 MG: 5 TABLET ORAL at 06:54

## 2023-10-17 RX ADMIN — KETOROLAC TROMETHAMINE 15 MG: 15 INJECTION, SOLUTION INTRAMUSCULAR; INTRAVENOUS at 02:31

## 2023-10-17 RX ADMIN — ATORVASTATIN CALCIUM 10 MG: 10 TABLET, FILM COATED ORAL at 09:03

## 2023-10-17 RX ADMIN — ASPIRIN 81 MG: 81 TABLET, COATED ORAL at 09:02

## 2023-10-17 RX ADMIN — FAMOTIDINE 20 MG: 20 TABLET ORAL at 02:31

## 2023-10-17 RX ADMIN — KETOROLAC TROMETHAMINE 15 MG: 15 INJECTION, SOLUTION INTRAMUSCULAR; INTRAVENOUS at 09:02

## 2023-10-17 RX ADMIN — ACETAMINOPHEN 650 MG: 325 TABLET ORAL at 09:03

## 2023-10-17 RX ADMIN — WATER 2000 MG: 1 INJECTION INTRAMUSCULAR; INTRAVENOUS; SUBCUTANEOUS at 02:30

## 2023-10-17 RX ADMIN — ACETAMINOPHEN 650 MG: 325 TABLET ORAL at 02:31

## 2023-10-17 RX ADMIN — LOSARTAN POTASSIUM 25 MG: 25 TABLET, FILM COATED ORAL at 09:03

## 2023-10-17 RX ADMIN — SODIUM CHLORIDE, PRESERVATIVE FREE 10 ML: 5 INJECTION INTRAVENOUS at 09:03

## 2023-10-17 ASSESSMENT — PAIN DESCRIPTION - LOCATION
LOCATION: KNEE

## 2023-10-17 ASSESSMENT — PAIN DESCRIPTION - ORIENTATION
ORIENTATION: LEFT

## 2023-10-17 ASSESSMENT — PAIN SCALES - GENERAL
PAINLEVEL_OUTOF10: 2
PAINLEVEL_OUTOF10: 5
PAINLEVEL_OUTOF10: 5

## 2023-10-17 ASSESSMENT — PAIN DESCRIPTION - DESCRIPTORS
DESCRIPTORS: DISCOMFORT
DESCRIPTORS: ACHING
DESCRIPTORS: DISCOMFORT

## 2023-10-17 NOTE — DISCHARGE INSTRUCTIONS
[address] [facility]     Patient Discharge Instructions    Rickie Bloch / [id number] : 1955    Admitted 10/16/2023 Discharged: 10/17/2023     IF YOU HAVE ANY PROBLEMS ONCE YOU ARE AT HOME CALL THE FOLLOWING NUMBERS:   Main office number: [phone]    Your follow up appointment to see either Dr. Jimy Hutchinson is scheduled in 1-2 weeks. If you are unsure of your appointment date call the office at [phone]. Take Home Medications     Resume your home medictions as directed  A prescription for pain medication has been given   It is important that you take the medication exactly as they are prescribed. Keep your medication in the bottles provided by the pharmacist and keep a list of the medication names, dosages, and times to be taken in your wallet. Do not take other medications without consulting your doctor. Note:  If you have already received and/or filled a prescription for one or more of the medications you've received a prescription for when leaving the hospital, you may disguard the duplicate prescription. What to do at [address] [place] [address] your prehospital diet. If you have excessive nausea or vomitting call your doctor's office     Do not remove dressing before seeing Dr. Sawyer Hatfield unless otherwise directed by home health or office. Wear portable sequential compressive devices at least 18 hours per day for 2 weeks. They may be used beyond 2 weeks as needed to help control swelling. MICKY hose should be worn during the day - may be removed for sleep. Should be worn on both legs for 2 weeks and then on the operative extremity for a total of 6 weeks. Begin In-Home Physical Therapy; 3 times a week to work on gait training, range of motion, strengthening, and weight bearing exercises as tolerable. STRETCH the knee every 2 hours, obtaining full extension and at least 90 degrees of flexion or better. Continue to use your walker or cane when walking.   May

## 2025-06-03 ENCOUNTER — HOSPITAL ENCOUNTER (OUTPATIENT)
Facility: HOSPITAL | Age: 70
Setting detail: RECURRING SERIES
Discharge: HOME | End: 2025-06-06
Payer: MEDICARE

## 2025-06-03 PROCEDURE — 92610 EVALUATE SWALLOWING FUNCTION: CPT

## 2025-06-03 PROCEDURE — 92524 BEHAVRAL QUALIT ANALYS VOICE: CPT

## 2025-06-03 NOTE — PROGRESS NOTES
ST DAILY TREATMENT NOTE/ VOICE EVAL & SWALLOW EVAL    Patient Name: Skylar Petty  Date:6/3/2025  : 1955  [x]  Patient  Verified  Payor: MEDICARE / Plan: MEDICARE PART A AND B / Product Type: *No Product type* /   In time:0830  Out time:0920  Total Treatment Time (min): 50  Visit #: 1 of 12    SUBJECTIVE  Pain Level (0-10 scale): 0  Any medication changes, allergies to medications, adverse drug reactions, diagnosis change, or new procedure performed?: [x] No    [] Yes (see summary sheet for update)  Subjective functional status/changes:   [] No changes reported  \"Now that I've been talking for a bit it's starting to feel uncomfortable in here\" (indicated sternal region)  Date of Onset: 24  Social History: , semi-retired , still substitute teaches  Prior Functional level: WFL for speech, language, voice, and swallowing  Radiology: Barium swallow/esophagram  Current diet: Regular solids and thin liquids  positioning: Upright in chair  Mental Status:  [x]alert []lethargic []confused  Orientation: [x]person [x]place [x]time [x]situation  AC Directions: []1-step []2-step []3-step [x]complex  Motivation: [x]excellent []good  []fair  []poor   Barriers to learning: [x]none []aphasia []?cognition []lethargy/motivation [][place]   []?vision []language []Fatigue/pain []psych factors compensate with:   Dentition: [x]normal []abnormal []edentulous []dentures   Respiratory Status: [x]WFL []SOB  []O2 L/min:  []NC []Mask               []Trach Tube:                     []Excess

## 2025-06-04 NOTE — PROGRESS NOTES
[facility] at [facility]  [address] [place] [address]  Ph [phone]  Fx [phone]    Plan of Care/ Statement of Necessity for Speech Therapy Services    Patient name: Skylar Petty Start of Care: 6/3/2025   Referral source: Mikael Dangelo MD : 1955    Medical Diagnosis: Dysphagia, unspecified [R13.10]  Dysphonia [R49.0]  Payor: MEDICARE / Plan: MEDICARE PART A AND B / Product Type: *No Product type* /  Onset Date:2024    Treatment Diagnosis: Dysphagia, unspecified R13.10, Dysphonia R49.0, and other voice and resonance disorders R49.8   Prior Hospitalization: see medical history Provider#: [id number]    Comorbidities: HTN, HLD, arthritis   Prior Level of Function: WFL for speech, language, voice, and swallowing       The Plan of Care and following information is based on the information from the initial evaluation.    Assessment/ key information: OP VOICE EVALUATION  Skylar Petty is a 69 yo female who presents to [facility] for voice and swallowing evaluations d/t a hoarse vocal quality and discomfort w/ vocal use as well as occasional pain and difficulty swallowing food, liquid, and at times her saliva. PMHx of HTN, HLD, and arthritis. 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 Pt reports these difficulties began approximately 1 year prior and have gradually worsened. She describes her vocal and swallow symptoms as worsening w/ vocal use, which occurs while substitute teaching or when having 3-way phone conversations w/ her family members. Reports vocal rest is required for her vocal quality to improve, making it difficult to work as a  or converse w/ her family members. Reports she feels the sensation of \"tightness\" and discomfort in the sternal region that accompanies her vocal symptoms, especially when attempting to project her voice, leading her to suspect she

## 2025-06-05 ENCOUNTER — TELEPHONE (OUTPATIENT)
Facility: HOSPITAL | Age: 70
End: 2025-06-05

## 2025-06-05 NOTE — TELEPHONE ENCOUNTER
pt cxl>24 due to going OOT & has declined, r/s due to her calendar being too full & what she has s/c is all that she knows she can do at this time

## 2025-06-10 ENCOUNTER — APPOINTMENT (OUTPATIENT)
Facility: HOSPITAL | Age: 70
End: 2025-06-10
Payer: MEDICARE

## 2025-06-24 ENCOUNTER — HOSPITAL ENCOUNTER (OUTPATIENT)
Facility: HOSPITAL | Age: 70
Setting detail: RECURRING SERIES
Discharge: HOME | End: 2025-06-27
Payer: MEDICARE

## 2025-06-24 PROCEDURE — 92507 TX SP LANG VOICE COMM INDIV: CPT

## 2025-06-24 NOTE — PROGRESS NOTES
ST DAILY TREATMENT NOTE    Patient Name: Skylar Petty  Date:2025  : 1955  [x]  Patient  Verified  Payor: Payor: MEDICARE / Plan: MEDICARE PART A AND B / Product Type: *No Product type* /   In time:1553  Out time:1624  Total Treatment Time (min): 31  Visit #: 2 of 12    Treatment Diagnosis: Dysphagia, unspecified [R13.10]  Dysphonia [R49.0]    SUBJECTIVE  Pain Level (0-10 scale): 0    Subjective functional status/changes:   [x] No changes reported  \"I'll have to work on not talking so loud\"    OBJECTIVE  Treatment provided includes:  Increase/Improve:  [x]  Voice Quality []  Cognitive Linguistic Skills []  Laryngeal/Pharyngeal Exercises   [x]  Vocal Loudness []  Reading Comprehension []  Swallowing Skills    [x]  Vocal Cord Function []  Auditory Comprehension []  Oral Motor Skills   []  Resonance []  Writing Skills []  Compensatory strategies    []  Speech Intelligibility []  Expressive Language []  Attention   [x]  Breath Support/Coord. []  Receptive language []  Memory   []  Articulation []  Safety Awareness [x]  Education   []  Fluency []  Word Retrieval []      Treatment Provided:  Education provided RE understanding how the voice works, vocal hygiene and strategies to maximize vocal functioning, hydration and mucus management, and potential impacts of throat clearing.    Patient/Caregiver  Education: [x] Review HEP      HEP/Handouts given: How the Voice Works, Do's and Don'ts for Maximizing Voice, Hydration and Managing Mucus, Throat Clearing and Lifestyle    Pain Level (0-10 scale) post treatment: 0    ASSESSMENT   Pt reports some improvement to vocal quality and discomfort since taking Prilosec since evaluation. Pt benefited from explanations and examples during education on voice and vocal hygiene to support improved comprehension. D/w pt slowly incorporating strategies and eliminating potentially vocally abuse behaviors, including speaking loudly and frequently clearing throat.     []

## 2025-06-24 NOTE — PROGRESS NOTES
[facility] at [facility]  [address] [place] [address]  Ph [phone]  Fx [phone]    Speech Therapy Progress Note  Patient name: Skylar Petty Start of Care: 6/3/2025   Referral source: Mikael Dangelo MD : 1955   Medical/Treatment Diagnosis: Dysphagia, unspecified  Dysphonia Onset Date:2024   Prior Hospitalization: see medical history Provider#: [id number]   Comorbidities: HTN, HLD, arthritis     Reporting Period: 6/3/25-25    Visits from Start of Care: 2    Missed Visits: 0    Goals/Measure of Progress:  Short Term Goals: To be accomplished in 6 treatments  1.) Pt will utilize vocal hygiene strategies (including drinking 60-80 oz water a day, avoiding throat clearing/coughing, adequately manage LPR/reflux) w/ 90% acc independently, per pt report to improve vocal functioning.   Status at eval: Education to be initiated during future session.  25: Education initiated     2.) Pt will produce an easy resonant vocal quality in phrases/sentences, as determined by clinician w/ Gonzalez, to decrease laryngeal tension and improve oral resonance.   Status at eval: Intermittently demo'ing vocal tension and hoarseness.     3.) Pt will use diaphragmatic/abdominal breath management with good connection of airflow to phonation for automatics, words, phrases and simple nasal sentences with 80% acc with modA during structured phonation/speaking tasks to improve breath support.  Status at eval: Demo'ing accessory muscle breathing pattern.     4.) The pt will recall/demo aspiration precautions and safe swallowing strategies with 90% acc when provided with Gonzalez (small sips/bites; no straws; alternate liquids/solids; slow rate; oral care 2-3x daily; Sit upright at 90 degree angle during po trials and for 30 minute post po intake) to decrease the reported incidences of dysphagia and s/sx of aspiration.   Status at eval: Education to be initiated during future session.     Long Term Goals:

## 2025-07-08 ENCOUNTER — TELEPHONE (OUTPATIENT)
Facility: HOSPITAL | Age: 70
End: 2025-07-08

## 2025-07-10 ENCOUNTER — APPOINTMENT (OUTPATIENT)
Facility: HOSPITAL | Age: 70
End: 2025-07-10
Payer: MEDICARE

## 2025-07-17 ENCOUNTER — APPOINTMENT (OUTPATIENT)
Facility: HOSPITAL | Age: 70
End: 2025-07-17
Payer: MEDICARE

## 2025-07-22 ENCOUNTER — HOSPITAL ENCOUNTER (OUTPATIENT)
Facility: HOSPITAL | Age: 70
Setting detail: RECURRING SERIES
Discharge: HOME | End: 2025-07-25
Payer: MEDICARE

## 2025-07-22 PROCEDURE — 92507 TX SP LANG VOICE COMM INDIV: CPT

## 2025-07-22 NOTE — PROGRESS NOTES
ST DAILY TREATMENT NOTE    Patient Name: Skylar Petty  Date:2025  : 1955  [x]  Patient  Verified  Payor: Payor: MEDICARE / Plan: MEDICARE PART A AND B / Product Type: *No Product type* /   In time:1553  Out time:1627  Total Treatment Time (min): 34  Visit #: 3 of 12    Treatment Diagnosis: Dysphagia, unspecified [R13.10]  Dysphonia [R49.0]    SUBJECTIVE  Pain Level (0-10 scale): 0    Subjective functional status/changes:   [x] No changes reported  \"Let's do another visit so that I can learn some of those exercises\"    OBJECTIVE  Treatment provided includes:  Increase/Improve:  [x]  Voice Quality []  Cognitive Linguistic Skills []  Laryngeal/Pharyngeal Exercises   []  Vocal Loudness []  Reading Comprehension []  Swallowing Skills    [x]  Vocal Cord Function []  Auditory Comprehension []  Oral Motor Skills   []  Resonance []  Writing Skills []  Compensatory strategies    []  Speech Intelligibility []  Expressive Language []  Attention   []  Breath Support/Coord. []  Receptive language []  Memory   []  Articulation []  Safety Awareness [x]  Vocal hygiene education   []  Fluency []  Word Retrieval []      Treatment Provided:  Education provided on GERD and LPR w/ review of strategies to improve vocal hygiene and reduce potentially vocally abusive behaviors.    Patient/Caregiver  Education: [x] Review HEP      HEP/Handouts given: Managing reflux and all about reflux handouts    Pain Level (0-10 scale) post treatment: 0    ASSESSMENT   Pt benefited from examples of each strategy for improving vocal hygiene w/ rephrasing/further explanation to support comprehension and memory. Education included an overview of normal digestion, anatomy of structures of the upper GI tract, the definition of GERD and LPR along w/ potential associated symptoms, and the potential diagnostics testing and professionals that may be involved in the dx of or care of GERD/LPR and potential impacts. Education also included lifestyle

## 2025-07-22 NOTE — PROGRESS NOTES
[facility] at [facility]  [address] [place] [address]  Ph [phone]  Fx [phone]    Speech Therapy Progress Note  Patient name: Skylar Petty Start of Care: 6/3/2025   Referral source: Mikael Dangelo MD : 1955   Medical/Treatment Diagnosis: Dysphagia, unspecified  Dysphonia Onset Date:2024   Prior Hospitalization: see medical history Provider#: [id number]   Comorbidities: HTN, HLD, arthritis     Reporting Period: 25-25    Visits from Start of Care: 3    Missed Visits: 2    Goals/Measure of Progress:  Short Term Goals: To be accomplished in 6 treatments  1.) Pt will utilize vocal hygiene strategies (including drinking 60-80 oz water a day, avoiding throat clearing/coughing, adequately manage LPR/reflux) w/ 90% acc independently, per pt report to improve vocal functioning.   Status at eval: Education to be initiated during future session.  25: Education initiated  25: Education on GERD and LPR provided along w/ strategies to improve vocal hygiene. Pt reports improving water intake and attempting to reduce vocally abusive behaviors.     2.) Pt will produce an easy resonant vocal quality in phrases/sentences, as determined by clinician w/ Gonzalez, to decrease laryngeal tension and improve oral resonance.   Status at eval: Intermittently demo'ing vocal tension and hoarseness.     3.) Pt will use diaphragmatic/abdominal breath management with good connection of airflow to phonation for automatics, words, phrases and simple nasal sentences with 80% acc with modA during structured phonation/speaking tasks to improve breath support.  Status at eval: Demo'ing accessory muscle breathing pattern.     4.) The pt will recall/demo aspiration precautions and safe swallowing strategies with 90% acc when provided with Gonzalez (small sips/bites; no straws; alternate liquids/solids; slow rate; oral care 2-3x daily; Sit upright at 90 degree angle during po trials and for 30 minute

## 2025-08-04 ENCOUNTER — TELEPHONE (OUTPATIENT)
Facility: HOSPITAL | Age: 70
End: 2025-08-04

## 2025-08-11 ENCOUNTER — TELEPHONE (OUTPATIENT)
Facility: HOSPITAL | Age: 70
End: 2025-08-11

## (undated) DEVICE — GLOVE ORANGE PI 8   MSG9080

## (undated) DEVICE — 3 BONE CEMENT MIXER: Brand: MIXEVAC

## (undated) DEVICE — GARMENT COMPR M FOR 13IN FT INTMIT SGL BLDR HEM FORC II

## (undated) DEVICE — PIN DRL THRD HDLSS SIG

## (undated) DEVICE — ZIMMER® STERILE DISPOSABLE TOURNIQUET CUFF WITH PROTECTIVE SLEEVE AND PLC, SINGLE PORT, SINGLE BLADDER, 34 IN. (86 CM)

## (undated) DEVICE — CONTAINER,SPECIMEN,O.R.STRL,4.5OZ: Brand: MEDLINE

## (undated) DEVICE — NEEDLE SPNL 20GA L3.5IN YEL HUB S STL REG WALL FIT STYL W/

## (undated) DEVICE — 3M™ STERI-DRAPE™ U-DRAPE 1015: Brand: STERI-DRAPE™

## (undated) DEVICE — SOLUTION SURG PREP 26 CC PURPREP

## (undated) DEVICE — PACK PROCEDURE SURG TOT KNEE CUST

## (undated) DEVICE — TOWEL,OR,DSP,ST,BLUE,STD,4/PK,20PK/CS: Brand: MEDLINE

## (undated) DEVICE — 3M™ IOBAN™ 2 ANTIMICROBIAL INCISE DRAPE 6650EZ: Brand: IOBAN™ 2

## (undated) DEVICE — ADHESIVE SKIN CLOSURE WND 8.661X1.5 IN 22 CM LIQUIBAND SECUR

## (undated) DEVICE — ELECTRODE PT RET AD L9FT HI MOIST COND ADH HYDRGEL CORDED

## (undated) DEVICE — 1010 S-DRAPE TOWEL DRAPE 10/BX: Brand: STERI-DRAPE™

## (undated) DEVICE — SUTURE STRATAFIX SZ 1 L14IN ABSRB VLT L36MM MO-4 TAPERPOINT SXPD2B400

## (undated) DEVICE — APPLICATOR MEDICATED 26 CC SOLUTION HI LT ORNG CHLORAPREP

## (undated) DEVICE — PADDING CAST W6INXL4YD COT LO LINTING WYTEX

## (undated) DEVICE — STRYKER PERFORMANCE SERIES SAGITTAL BLADE: Brand: STRYKER PERFORMANCE SERIES

## (undated) DEVICE — HANDPIECE SET WITH HIGH FLOW TIP AND SUCTION TUBE: Brand: INTERPULSE

## (undated) DEVICE — SUTURE VCRL + SZ 2-0 L27IN ABSRB UD CT-2 L26MM 1/2 CIR TAPR VCP269H

## (undated) DEVICE — SHEET,DRAPE,70X100,STERILE: Brand: MEDLINE

## (undated) DEVICE — OPTIFOAM GENTLE SA, POSTOP, 4X12: Brand: MEDLINE

## (undated) DEVICE — GLOVE PROTCT PF LG ANTIMICROBIAL A4 CUT RESIST SCEPTER LTX

## (undated) DEVICE — GUIDEPIN ORTH THRD HI PERF HD SIG

## (undated) DEVICE — SOLUTION IRRIG 500ML 0.9% SOD CHLO USP POUR PLAS BTL

## (undated) DEVICE — GLOVE SURG SZ 8 L12IN FNGR THK79MIL GRN LTX FREE

## (undated) DEVICE — SUTURE ETHBND EXCEL SZ 1 L30IN NONABSORBABLE GRN OS-8 L40MM X548H

## (undated) DEVICE — SUTURE VCRL SZ 0 L36IN ABSRB UD CT-1 L36MM 1/2 CIR TAPR PNT VCP946H

## (undated) DEVICE — SUTURE MCRYL + SZ 3-0 L27IN ABSRB UD PS1 L24MM 3/8 CIR REV MCP936H

## (undated) DEVICE — HOOD, PEEL-AWAY: Brand: FLYTE